# Patient Record
Sex: FEMALE | URBAN - METROPOLITAN AREA
[De-identification: names, ages, dates, MRNs, and addresses within clinical notes are randomized per-mention and may not be internally consistent; named-entity substitution may affect disease eponyms.]

---

## 2017-04-14 NOTE — PATIENT DISCUSSION
(L50.228) Cysts of right lower eyelid - Assesment : Examination revealed cysts on eyelid.    RIGHT MEDIAL CANTHUS - Plan : Minor Surgery LID CYST RLL TODAY  BACITRACIN CHRISTINE APPLIED  PT TO USE MAXITROL CHRISTINE TID X 1 WEEK THEN STOP

## 2017-06-27 NOTE — PATIENT DISCUSSION
(L57.0) Actinic keratosis - Assesment : Evaluation revealed Actinic Keratosis RLL MEDIAL 6 MM X 3 MM SCALING- ERYTHEMA  PATIENT WISHES TO USE MAXITROL CHRISTINE RLL  CONSIDER BX IN 1-2 MONTHS IF NO IMPROVEMENT - Plan : MAXITROL CHRISTINE RLL QHS X 7-10  NIGHTS THEN STOP  CONSIDER BX  1-2 MONTH F/U

## 2018-06-11 NOTE — PATIENT DISCUSSION
(E11.9) Type 2 diabetes mellitus without complications - Assesment : Examination reveals Type 2 Diabetes mellitus without ocular complications. No signs of edema or hemorrhages seen in either eye today - Plan : Continue following with PCP for routine care. Stressed importance of keeping A1c levels below 6.5 and monitoring blood sugar. Letter explaining today's findings faxed to patient's PCP. RV 1 Year for Complete Exam, or sooner if having problems or changes in vision.

## 2019-06-06 NOTE — PATIENT DISCUSSION
(E11.9) Type 2 diabetes mellitus without complications - Assesment : Examination reveals Type 2 Diabetes mellitus without ocular complications. No signs of edema or hemorrhages seen in either eye today - Plan : Continue following with PCP for routine care. Stressed importance of keeping A1c levels below 6.5 and monitoring blood sugar. Letter explaining today's findings faxed to patient's PCP.

## 2019-06-06 NOTE — PATIENT DISCUSSION
(H35.363) Drusen (degenerative) of macula, bilateral - Assesment : Examination revealed Drusen. - Plan : Monitor for changes. Advised patient to call our office with decreased vision or increased symptoms.

## 2019-06-06 NOTE — PATIENT DISCUSSION
(H35.373) Puckering of macula, bilateral - Assesment : Examination revealed ERM OU-stable. - Plan : Monitor for change.  CALL WITH DECREASED VISION.  1 YEAR EXAM

## 2019-06-06 NOTE — PATIENT DISCUSSION
(L57.0) Actinic keratosis - Assesment : Evaluation revealed Actinic Keratosis.    LEFT EYE 9MM INFERIOR LASH LINE - Plan : MINOR SX  BIOPSY

## 2019-10-01 ENCOUNTER — IMPORTED ENCOUNTER (OUTPATIENT)
Dept: URBAN - METROPOLITAN AREA CLINIC 9 | Facility: CLINIC | Age: 29
End: 2019-10-01

## 2021-10-16 ASSESSMENT — VISUAL ACUITY
OS_SC: 20/20 - SN
OD_SC: 20/20 SN

## 2021-10-16 ASSESSMENT — TONOMETRY
OS_IOP_MMHG: 15
OD_IOP_MMHG: 16

## 2022-09-02 NOTE — PATIENT DISCUSSION
MAC OCT performed today , stable OU.  Instructed to call immediately if any new distortion, blurring, decreased vision or eye pain.

## 2023-08-22 NOTE — PATIENT DISCUSSION
(G43.305) Cysts of right lower eyelid - Assesment : Examination revealed cysts on eyelid.    RIGHT MEDIAL CANTHUS Biopsy performed 12/20/16 by Twilla Aschoff Biopsy results negative for BCCA, is a lid cyst - Plan : Minor Surgery Outreach attempts to coordinate scheduling on the patient's [facility] to [facility] order in workqueue [id number] requested on 8/21/2023 have been conducted.    The order has been removed from the workqueue but is still valid for one year from date the order was placed. No additional orders will need to be placed for this request. Patient may call Central Scheduling at [phone] and we would be happy to assist them.